# Patient Record
Sex: MALE | Race: WHITE | NOT HISPANIC OR LATINO | Employment: STUDENT | RURAL
[De-identification: names, ages, dates, MRNs, and addresses within clinical notes are randomized per-mention and may not be internally consistent; named-entity substitution may affect disease eponyms.]

---
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
## 2025-01-13 ENCOUNTER — ATHLETIC TRAINING SESSION (OUTPATIENT)
Dept: SPORTS MEDICINE | Facility: CLINIC | Age: 20
End: 2025-01-13
Payer: COMMERCIAL

## 2025-01-13 DIAGNOSIS — M75.21 BICEPS TENDINITIS OF RIGHT SHOULDER: Primary | ICD-10-CM

## 2025-01-13 DIAGNOSIS — S46.811A TRAPEZIUS MUSCLE STRAIN, RIGHT, INITIAL ENCOUNTER: ICD-10-CM

## 2025-01-13 NOTE — PROGRESS NOTES
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    HPI    ROS              Objective: tight trapezius on R side       General: Zafar is well-developed, well-nourished, appears stated age, in no acute distress, alert and oriented to time, place and person.     AT Session          Assessment: Bicep Tendinitis or tight muscles      Status: F - Full Participation    Date Seen:  01/13/2025    Date of Injury:  N/A    Date Out:  N/A    Date Cleared:  N/A        Treatment/Rehab/Maintenance: electrical stimulation, moist heat pad, and cupping            Plan: continue stretching and warming-up before pitching       1. N/A  2. Physician Referral: no  3. ED Referral:no  4. Parent/Guardian Notified: No  5. All questions were answered, ath. will contact me for questions or concerns in  the interim.  6.         Eligible to use School Insurance: Yes

## 2025-02-05 ENCOUNTER — ATHLETIC TRAINING SESSION (OUTPATIENT)
Dept: SPORTS MEDICINE | Facility: CLINIC | Age: 20
End: 2025-02-05
Payer: COMMERCIAL

## 2025-02-05 DIAGNOSIS — M25.511 ACUTE PAIN OF RIGHT SHOULDER: Primary | ICD-10-CM

## 2025-02-05 DIAGNOSIS — S59.911A INJURY OF RIGHT FOREARM, INITIAL ENCOUNTER: ICD-10-CM

## 2025-02-05 NOTE — PROGRESS NOTES
Reason for Encounter Follow-Up    Subjective: pain of right shoulder, biceps and forearm       Chief Complaint: Zafar Cisneros is a 19 y.o. male student at [employer] who had concerns including Pain of the Right Shoulder (Soreness from pitching) and Pain of the Right Forearm (Soreness from pitching).    Handedness: right-handed  Sport played: baseball      Level: college      Position:pitcher      Pain  This is a recurrent problem. The current episode started 1 to 4 weeks ago. The problem occurs intermittently. The problem has been rapidly improving. The symptoms are aggravated by exertion and twisting. He has tried heat and ice for the symptoms. The treatment provided moderate relief.       Review of Systems   Musculoskeletal:  Positive for joint pain and stiffness.   All other systems reviewed and are negative.                Objective: pain in right shoulder, biceps and forearm       General: Zafar is well-developed, well-nourished, appears stated age, in no acute distress, alert and oriented to time, place and person.     AT Session          Assessment: muscle soreness in right shoulder, biceps and forearm due to pitching     Status: F - Full Participation    Date Seen:  2/5/2025    Date of Injury:  2/4/2025    Date Out:  N/A    Date Cleared:  N/A        Treatment/Rehab/Maintenance: []  INJURY TREATMENT   [x]  MAINTENANCE  DATE OF SERVICE: 2/5/2025  INJURY/CONDITON: right shoulder, biceps and forearm      MODALITIES:        THERAPEUTIC EXERCISES:   MISCELLANEOUS:  []Active Release       []Agility      []Compression Wrap  []Cupping        []Balance      []Support Wrap  []Dry Needling       []Blood Flow Restriction    []Taping - Preventative  []E-Stim- Bi-Phasic       []Foam Roller      []Taping - Injured Part  [x]E-Stim- IFC        []Functional Exercises    []Wound Care  []E-Stim- Micro       []Hydroworks        [x]E-Stim- Premod       []Joint Mobilization  []E-Stim- [place]       []Manual  Therapy  []E-Stim- Triphasic PENS      []Plyometric Exercises  []Cold Tub        []Proprioception Exercises  []Contrast Tub       []Rehab - Aquatic  []Game Ready       []Rehab - Back  [x]Hot Pack        []Rehab - Bike  []Hot Tub        []Rehab - Core  []Ice Cup        []Rehab - Elliptical  []Ice Pack        []Rehab - Exercise  [x]Instrumental Assisted Soft Tissue Mobilization (IASTM)  []Rehab - Lower Extremity  []Intermittent Compression      []Rehab - Neck  []Laser        []Rehab - Run  []Lightwave        []Rehab - Treadmill  []Massage        []Rehab - Upper Body Ergometer  []Massage - Instrument Assisted     []Rehab - Upper Extremity  []Massage - Scar Tissue      []Return to Play - Concussion Protocol  []Massage - Self Administered     []Return to Play - Sport Specific  []Massage- Therapeutic      []ROM - Active  []Myofascial Release       []ROM - Passive  []Paraffin Wax       []Strengthening Exercises  []Ultrasound        []Stretching - Active  []Ultrasound and E-Stim      []Stretching - Dynamic           []Stretching - Passive  []Stretching - PNF  []Stretching - Static          OTHER/COMMENTS:                                Plan: Treat for pain/soreness, referral to orthopedics is no improvement.        1. N/A  2. Physician Referral: no  3. ED Referral:no  4. Parent/Guardian Notified: No  5. All questions were answered, ath. will contact me for questions or concerns in  the interim.  6.         Eligible to use School Insurance: Yes

## 2025-03-03 ENCOUNTER — ATHLETIC TRAINING SESSION (OUTPATIENT)
Dept: SPORTS MEDICINE | Facility: CLINIC | Age: 20
End: 2025-03-03
Payer: COMMERCIAL

## 2025-03-03 DIAGNOSIS — M25.511 ACUTE PAIN OF RIGHT SHOULDER: Primary | ICD-10-CM

## 2025-03-03 DIAGNOSIS — M75.21 BICEPS TENDINITIS OF RIGHT SHOULDER: ICD-10-CM

## 2025-03-03 NOTE — PROGRESS NOTES
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    Handedness: right-handed  Sport played: baseball      Level: college      Position:pitcher    Zafar also participates in baseball.  Pain  This is a recurrent problem. The current episode started 1 to 4 weeks ago. The problem occurs intermittently. The problem has been rapidly improving. The symptoms are aggravated by exertion and twisting (pitching). He has tried heat (electrical stimulation) for the symptoms. The treatment provided moderate relief.       Review of Systems   Musculoskeletal:  Positive for joint pain and stiffness.   All other systems reviewed and are negative.                Objective: pain in right shoulder, biceps and forearm       General: Zafar is well-developed, well-nourished, appears stated age, in no acute distress, alert and oriented to time, place and person.     AT Session          Assessment: muscle soreness in right shoulder, biceps and forearm due to pitching     Status: F - Full Participation    Date Seen:  03/03/2025    Date of Injury:  2/4/2025    Date Out:  N/A    Date Cleared:  N/A        Treatment/Rehab/Maintenance: []  INJURY TREATMENT   [x]  MAINTENANCE  DATE OF SERVICE: 03/03/2025  INJURY/CONDITON: right shoulder, biceps and forearm      MODALITIES:        THERAPEUTIC EXERCISES:   MISCELLANEOUS:  []Active Release       []Agility      []Compression Wrap  []Cupping        []Balance      []Support Wrap  []Dry Needling       []Blood Flow Restriction    []Taping - Preventative  []E-Stim- Bi-Phasic       []Foam Roller      []Taping - Injured Part  [x]E-Stim- IFC        []Functional Exercises    []Wound Care  []E-Stim- Micro       []Hydroworks        []E-Stim- Premod       []Joint  Mobilization  []E-Stim- [place]       []Manual Therapy  []E-Stim- Triphasic PENS      []Plyometric Exercises  []Cold Tub        []Proprioception Exercises  []Contrast Tub       []Rehab - Aquatic  []Game Ready       []Rehab - Back  [x]Hot Pack        []Rehab - Bike  []Hot Tub        []Rehab - Core  []Ice Cup        []Rehab - Elliptical  []Ice Pack        []Rehab - Exercise  [x]Instrumental Assisted Soft Tissue Mobilization (IASTM)  []Rehab - Lower Extremity  []Intermittent Compression      []Rehab - Neck  []Laser        []Rehab - Run  []Lightwave        []Rehab - Treadmill  []Massage        []Rehab - Upper Body Ergometer  []Massage - Instrument Assisted     []Rehab - Upper Extremity  []Massage - Scar Tissue      []Return to Play - Concussion Protocol  []Massage - Self Administered     []Return to Play - Sport Specific  []Massage- Therapeutic      []ROM - Active  []Myofascial Release       []ROM - Passive  []Paraffin Wax       []Strengthening Exercises  []Ultrasound        []Stretching - Active  []Ultrasound and E-Stim      []Stretching - Dynamic           []Stretching - Passive  []Stretching - PNF  []Stretching - Static          OTHER/COMMENTS:                                Plan: Treat for pain/soreness, referral to orthopedics is no improvement.        1. N/A  2. Physician Referral: no  3. ED Referral:no  4. Parent/Guardian Notified: No  5. All questions were answered, ath. will contact me for questions or concerns in  the interim.  6.         Eligible to use School Insurance: Yes

## 2025-03-04 ENCOUNTER — ATHLETIC TRAINING SESSION (OUTPATIENT)
Dept: SPORTS MEDICINE | Facility: CLINIC | Age: 20
End: 2025-03-04
Payer: COMMERCIAL

## 2025-03-04 DIAGNOSIS — M75.21 BICEPS TENDINITIS OF RIGHT SHOULDER: Primary | ICD-10-CM

## 2025-03-04 NOTE — PROGRESS NOTES
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    Handedness: right-handed  Sport played: baseball      Level: college      Position:pitcher    Zafar also participates in baseball.  Pain  This is a recurrent problem. The current episode started 1 to 4 weeks ago. The problem occurs intermittently. The problem has been rapidly improving. The symptoms are aggravated by exertion and twisting (pitching). He has tried heat (electrical stimulation) for the symptoms. The treatment provided moderate relief.       Review of Systems   Musculoskeletal:  Positive for joint pain and stiffness.   All other systems reviewed and are negative.                Objective: pain in right shoulder, biceps and forearm       General: Zafar is well-developed, well-nourished, appears stated age, in no acute distress, alert and oriented to time, place and person.     AT Session          Assessment: muscle soreness in right shoulder, biceps and forearm due to pitching     Status: F - Full Participation    Date Seen:  03/04/2025    Date of Injury:  02/04/2025    Date Out:  N/A    Date Cleared:  N/A        Treatment/Rehab/Maintenance: []  INJURY TREATMENT   [x]  MAINTENANCE  DATE OF SERVICE: 03/04/2025  INJURY/CONDITON: right shoulder, biceps and forearm      MODALITIES:        THERAPEUTIC EXERCISES:   MISCELLANEOUS:  []Active Release       []Agility      []Compression Wrap  []Cupping        []Balance      []Support Wrap  []Dry Needling       []Blood Flow Restriction    []Taping - Preventative  []E-Stim- Bi-Phasic       []Foam Roller      []Taping - Injured Part  []E-Stim- IFC        []Functional Exercises    []Wound Care  []E-Stim- Micro       []Hydroworks        [x]E-Stim- Premod       []Joint Mobilization  []E-Stim- [place]       []Manual  Therapy  []E-Stim- Triphasic PENS      []Plyometric Exercises  []Cold Tub        []Proprioception Exercises  []Contrast Tub       []Rehab - Aquatic  []Game Ready       []Rehab - Back  [x]Hot Pack        []Rehab - Bike  []Hot Tub        []Rehab - Core  []Ice Cup        []Rehab - Elliptical  []Ice Pack        []Rehab - Exercise  []Instrumental Assisted Soft Tissue Mobilization (IASTM)  []Rehab - Lower Extremity  [x]Intermittent Compression      []Rehab - Neck  []Laser        []Rehab - Run  []Lightwave        []Rehab - Treadmill  []Massage        []Rehab - Upper Body Ergometer  []Massage - Instrument Assisted     []Rehab - Upper Extremity  []Massage - Scar Tissue      []Return to Play - Concussion Protocol  []Massage - Self Administered     []Return to Play - Sport Specific  []Massage- Therapeutic      []ROM - Active  []Myofascial Release       []ROM - Passive  []Paraffin Wax       []Strengthening Exercises  []Ultrasound        []Stretching - Active  []Ultrasound and E-Stim      []Stretching - Dynamic           []Stretching - Passive  []Stretching - PNF  []Stretching - Static          OTHER/COMMENTS:                                Plan: Treat for pain/soreness, referral to orthopedics is no improvement.        1. N/A  2. Physician Referral: no  3. ED Referral:no  4. Parent/Guardian Notified: No  5. All questions were answered, ath. will contact me for questions or concerns in  the interim.  6.         Eligible to use School Insurance: Yes

## 2025-03-14 ENCOUNTER — ATHLETIC TRAINING SESSION (OUTPATIENT)
Dept: SPORTS MEDICINE | Facility: CLINIC | Age: 20
End: 2025-03-14
Payer: COMMERCIAL

## 2025-03-14 DIAGNOSIS — Z00.00 HEALTHCARE MAINTENANCE: ICD-10-CM

## 2025-03-14 DIAGNOSIS — M25.511 ACUTE PAIN OF RIGHT SHOULDER: Primary | ICD-10-CM

## 2025-03-14 NOTE — PROGRESS NOTES
Reason for Encounter New Injury    Subjective: acute right posterior shoulder pain       Chief Complaint: Zafar Cisneros is a 20 y.o. male  at [employer] who had concerns including Pain of the Right Shoulder (Poster right shoulder pain).    Handedness: right-handed  Sport played: baseball      Level: college      Position:pitcher      Pain  This is a recurrent problem. The current episode started in the past 7 days. The problem occurs intermittently. The problem has been rapidly improving. The symptoms are aggravated by exertion. He has tried heat and ice for the symptoms. The treatment provided moderate relief.       Review of Systems   Musculoskeletal:  Positive for muscle weakness and stiffness.   All other systems reviewed and are negative.                Objective: acute right posterior shoulder pain       General: Zafar is well-developed, well-nourished, appears stated age, in no acute distress, alert and oriented to time, place and person.     AT Session          Assessment: posterior right shoulder, rotator cuff soreness , fatigue from pitching.     Status: AT - Cleared to Exert    Date Seen:  03/14/2025    Date of Injury:  03/12/2025    Date Out:  N/A    Date Cleared:  N/A        Treatment/Rehab/Maintenance: [x]  INJURY TREATMENT   []  MAINTENANCE  DATE OF SERVICE: 3/14/2025  INJURY/CONDITON: right shoulder      MODALITIES:        THERAPEUTIC EXERCISES:   MISCELLANEOUS:  []Active Release       []Agility      []Compression Wrap  []Cupping        []Balance      []Support Wrap  []Dry Needling       []Blood Flow Restriction    []Taping - Preventative  []E-Stim- Bi-Phasic       []Foam Roller      []Taping - Injured Part  []E-Stim- IFC        []Functional Exercises    []Wound Care  []E-Stim- Micro       []Hydroworks        [x]E-Stim- Premod       []Joint Mobilization  []E-Stim- [place]       []Manual Therapy  []E-Stim- Triphasic PENS      []Plyometric Exercises  []Cold  Tub        []Proprioception Exercises  []Contrast Tub       []Rehab - Aquatic  []Game Ready       []Rehab - Back  [x]Hot Pack        []Rehab - Bike  []Hot Tub        []Rehab - Core  []Ice Cup        []Rehab - Elliptical  []Ice Pack        []Rehab - Exercise  [x]Instrumental Assisted Soft Tissue Mobilization (IASTM)  []Rehab - Lower Extremity  []Intermittent Compression      []Rehab - Neck  []Laser        []Rehab - Run  []Lightwave        []Rehab - Treadmill  []Massage        []Rehab - Upper Body Ergometer  []Massage - Instrument Assisted     []Rehab - Upper Extremity  []Massage - Scar Tissue      []Return to Play - Concussion Protocol  []Massage - Self Administered     []Return to Play - Sport Specific  []Massage- Therapeutic      []ROM - Active  []Myofascial Release       []ROM - Passive  []Paraffin Wax       []Strengthening Exercises  []Ultrasound        [x]Stretching - Active  []Ultrasound and E-Stim      []Stretching - Dynamic           []Stretching - Passive  []Stretching - PNF  []Stretching - Static          OTHER/COMMENTS:                                Plan: continue to treat for pain, referral to orthopedics if no improvement is shown       1. Orthopedics  2. Physician Referral: no  3. ED Referral:no  4. Parent/Guardian Notified: No  5. All questions were answered, ath. will contact me for questions or concerns in  the interim.  6.         Eligible to use School Insurance: Yes

## 2025-03-24 ENCOUNTER — ATHLETIC TRAINING SESSION (OUTPATIENT)
Dept: SPORTS MEDICINE | Facility: CLINIC | Age: 20
End: 2025-03-24
Payer: COMMERCIAL

## 2025-03-24 DIAGNOSIS — M62.830 SPASM OF LEFT TRAPEZIUS MUSCLE: Primary | ICD-10-CM

## 2025-03-24 DIAGNOSIS — M75.21 BICEPS TENDINITIS OF RIGHT SHOULDER: Primary | ICD-10-CM

## 2025-03-25 NOTE — PROGRESS NOTES
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    Handedness: right-handed  Sport played: baseball      Level: college      Position:pitcher    Zafar also participates in baseball.  Pain  This is a new problem. The current episode started today. The problem occurs intermittently. The problem has been unchanged. The symptoms are aggravated by twisting. He has tried heat (electrical stimulation) for the symptoms. The treatment provided moderate relief.       ROS              Objective: muscle spasm of upper back        General: Zafar is well-developed, well-nourished, appears stated age, in no acute distress, alert and oriented to time, place and person.     AT Session          Assessment: poss muscle spasm of lower trapezius      Status: F - Full Participation    Date Seen:  03/24/2025    Date of Injury:  03/24/2025    Date Out:  N/A    Date Cleared:  N/A        Treatment/Rehab/Maintenance: [x]  INJURY TREATMENT   []  MAINTENANCE  DATE OF SERVICE: 03/24/2025  INJURY/CONDITON: L trapezius muscle spasm      MODALITIES:        THERAPEUTIC EXERCISES:   MISCELLANEOUS:  []Active Release       []Agility      []Compression Wrap  []Cupping        []Balance      []Support Wrap  []Dry Needling       []Blood Flow Restriction    []Taping - Preventative  []E-Stim- Bi-Phasic       []Foam Roller      []Taping - Injured Part  []E-Stim- IFC        []Functional Exercises    []Wound Care  []E-Stim- Micro       []Hydroworks        [x]E-Stim- Premod       []Joint Mobilization  []E-Stim- [place]       []Manual Therapy  []E-Stim- Triphasic PENS      []Plyometric Exercises  []Cold Tub        []Proprioception Exercises  []Contrast Tub       []Rehab - Aquatic  []Game Ready       []Rehab - Back  [x]Hot Pack        []Rehab - Bike  []Hot Tub        []Rehab -  Core  []Ice Cup        []Rehab - Elliptical  []Ice Pack        []Rehab - Exercise  [x]Instrumental Assisted Soft Tissue Mobilization (IASTM)  []Rehab - Lower Extremity  []Intermittent Compression      []Rehab - Neck  []Laser        []Rehab - Run  []Lightwave        []Rehab - Treadmill  []Massage        []Rehab - Upper Body Ergometer  []Massage - Instrument Assisted     []Rehab - Upper Extremity  []Massage - Scar Tissue      []Return to Play - Concussion Protocol  []Massage - Self Administered     []Return to Play - Sport Specific  []Massage- Therapeutic      []ROM - Active  []Myofascial Release       []ROM - Passive  []Paraffin Wax       []Strengthening Exercises  []Ultrasound        [x]Stretching - Active  []Ultrasound and E-Stim      []Stretching - Dynamic           [x]Stretching - Passive  []Stretching - PNF  []Stretching - Static          OTHER/COMMENTS:                                 Plan: Treat pain; work on flexibility of chest and strengthen scapular muscles; Will refer to Orthopedics if pain continues or worsens.       1. N/A  2. Physician Referral: no  3. ED Referral:no  4. Parent/Guardian Notified: No  5. All questions were answered, ath. will contact me for questions or concerns in  the interim.  6.         Eligible to use School Insurance: Yes

## 2025-04-11 ENCOUNTER — ATHLETIC TRAINING SESSION (OUTPATIENT)
Dept: SPORTS MEDICINE | Facility: CLINIC | Age: 20
End: 2025-04-11
Payer: COMMERCIAL

## 2025-04-11 DIAGNOSIS — Z00.00 HEALTHCARE MAINTENANCE: Primary | ICD-10-CM

## 2025-04-11 NOTE — PROGRESS NOTES
Reason for Encounter N/A    Subjective: health care maintenance of R shoulder and R elbow after pitching       Chief Complaint: Zafar Cisneros is a 20 y.o. male  at [employer] who had concerns including Pain of the Right Shoulder.    Handedness: right-handed  Sport played: baseball      Level: college      Position:pitcher    Zafar also participates in baseball.  Pain  This is a recurrent problem. The current episode started 1 to 4 weeks ago. The problem occurs intermittently. He has tried heat (electrical stimulation) for the symptoms. The treatment provided significant relief.       ROS              Objective:  health care maintenance of right shoulder and right elbow       General: Zafar is well-developed, well-nourished, appears stated age, in no acute distress, alert and oriented to time, place and person.     AT Session          Assessment: healthcare maintenance     Status: F - Full Participation    Date Seen:  04/11/2025    Date of Injury:  N/A    Date Out:  N/A    Date Cleared:  N/A        Treatment/Rehab/Maintenance: []  INJURY TREATMENT   [x]  MAINTENANCE  DATE OF SERVICE: 04/11/2025  INJURY/CONDITON: R shoulder/R elbow      MODALITIES:        THERAPEUTIC EXERCISES:   MISCELLANEOUS:  []Active Release       []Agility      []Compression Wrap  []Cupping        []Balance      []Support Wrap  []Dry Needling       []Blood Flow Restriction    []Taping - Preventative  []E-Stim- Bi-Phasic       []Foam Roller      []Taping - Injured Part  []E-Stim- IFC        []Functional Exercises    []Wound Care  []E-Stim- Micro       []Hydroworks        [x]E-Stim- Premod       []Joint Mobilization  []E-Stim- [place]       []Manual Therapy  []E-Stim- Triphasic PENS      []Plyometric Exercises  []Cold Tub        []Proprioception Exercises  []Contrast Tub       []Rehab - Aquatic  []Game Ready       []Rehab - Back  [x]Hot Pack        []Rehab - Bike  []Hot Tub        []Rehab - Core  []Ice  Cup        []Rehab - Elliptical  []Ice Pack        []Rehab - Exercise  []Instrumental Assisted Soft Tissue Mobilization (IASTM)  []Rehab - Lower Extremity  []Intermittent Compression      []Rehab - Neck  []Laser        []Rehab - Run  []Lightwave        []Rehab - Treadmill  []Massage        []Rehab - Upper Body Ergometer  []Massage - Instrument Assisted     []Rehab - Upper Extremity  []Massage - Scar Tissue      []Return to Play - Concussion Protocol  []Massage - Self Administered     []Return to Play - Sport Specific  []Massage- Therapeutic      []ROM - Active  []Myofascial Release       []ROM - Passive  []Paraffin Wax       []Strengthening Exercises  []Ultrasound        []Stretching - Active  []Ultrasound and E-Stim      []Stretching - Dynamic           []Stretching - Passive  []Stretching - PNF  []Stretching - Static          OTHER/COMMENTS:                                 Plan: treat pain; will refer to Orthopedics if no improvement        1. Orthopedics  2. Physician Referral: no  3. ED Referral:no  4. Parent/Guardian Notified: No  5. All questions were answered, ath. will contact me for questions or concerns in  the interim.  6.         Eligible to use School Insurance: Yes

## 2025-04-16 ENCOUNTER — ATHLETIC TRAINING SESSION (OUTPATIENT)
Dept: SPORTS MEDICINE | Facility: CLINIC | Age: 20
End: 2025-04-16
Payer: COMMERCIAL

## 2025-04-16 DIAGNOSIS — Z00.00 HEALTHCARE MAINTENANCE: Primary | ICD-10-CM

## 2025-04-16 NOTE — PROGRESS NOTES
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    Handedness: right-handed  Sport played: baseball      Level: college      Position:pitcher    Zafar also participates in baseball.  Pain  This is a new problem. The current episode started today. The problem occurs intermittently. The problem has been unchanged. The symptoms are aggravated by twisting. He has tried heat (electrical stimulation) for the symptoms. The treatment provided moderate relief.       ROS              Objective: soreness and tightness of right shoulder       General: Zafar is well-developed, well-nourished, appears stated age, in no acute distress, alert and oriented to time, place and person.     AT Session          Assessment: poss muscle spasm of lower trapezius      Status: F - Full Participation    Date Seen:  04/16/2025    Date of Injury:  N/A    Date Out:  N/A    Date Cleared:  N/A        Treatment/Rehab/Maintenance: []  INJURY TREATMENT   [x]  MAINTENANCE  DATE OF SERVICE: 04/16/2025  INJURY/CONDITON: R shoulder pain      MODALITIES:        THERAPEUTIC EXERCISES:   MISCELLANEOUS:  []Active Release       []Agility      []Compression Wrap  []Cupping        []Balance      []Support Wrap  []Dry Needling       []Blood Flow Restriction    []Taping - Preventative  []E-Stim- Bi-Phasic       []Foam Roller      []Taping - Injured Part  []E-Stim- IFC        []Functional Exercises    []Wound Care  []E-Stim- Micro       []Hydroworks        []E-Stim- Premod       []Joint Mobilization  []E-Stim- [place]       []Manual Therapy  []E-Stim- Triphasic PENS      []Plyometric Exercises  []Cold Tub        []Proprioception Exercises  []Contrast Tub       []Rehab - Aquatic  []Game Ready       []Rehab - Back  [x]Hot Pack        []Rehab - Bike  []Hot  Tub        []Rehab - Core  []Ice Cup        []Rehab - Elliptical  []Ice Pack        []Rehab - Exercise  [x]Instrumental Assisted Soft Tissue Mobilization (IASTM)  []Rehab - Lower Extremity  []Intermittent Compression      []Rehab - Neck  []Laser        []Rehab - Run  []Lightwave        []Rehab - Treadmill  []Massage        []Rehab - Upper Body Ergometer  []Massage - Instrument Assisted     []Rehab - Upper Extremity  []Massage - Scar Tissue      []Return to Play - Concussion Protocol  []Massage - Self Administered     []Return to Play - Sport Specific  []Massage- Therapeutic      []ROM - Active  []Myofascial Release       []ROM - Passive  []Paraffin Wax       []Strengthening Exercises  []Ultrasound        []Stretching - Active  []Ultrasound and E-Stim      []Stretching - Dynamic           []Stretching - Passive  []Stretching - PNF  []Stretching - Static          OTHER/COMMENTS:                                 Plan: Treat pain; work on flexibility of chest and strengthen scapular muscles; Will refer to Orthopedics if no improvement.       1. N/A  2. Physician Referral: no  3. ED Referral:no  4. Parent/Guardian Notified: No  5. All questions were answered, ath. will contact me for questions or concerns in  the interim.  6.         Eligible to use School Insurance: Yes

## 2025-04-16 NOTE — PROGRESS NOTES
Reason for Encounter N/A    Subjective: soreness and tightness of right shoulder and right elbow after pitching       Chief Complaint: Zafar Cisneros is a 20 y.o. male  at [employer] who had concerns including Pain of the Right Shoulder.    Handedness: right-handed  Sport played: baseball      Level: college      Position:pitcher    Zafar also participates in baseball.  Pain  This is a recurrent problem. The current episode started 1 to 4 weeks ago. The problem occurs intermittently. He has tried heat (electrical stimulation) for the symptoms. The treatment provided significant relief.       ROS              Objective:  soreness and tightness of right shoulder and right elbow       General: Zafar is well-developed, well-nourished, appears stated age, in no acute distress, alert and oriented to time, place and person.     AT Session          Assessment: healthcare maintenance     Status: F - Full Participation    Date Seen:  04/16/2025    Date of Injury:  N/A    Date Out:  N/A    Date Cleared:  N/A        Treatment/Rehab/Maintenance: []  INJURY TREATMENT   [x]  MAINTENANCE  DATE OF SERVICE: 04/16/2025  INJURY/CONDITON: R shoulder/R elbow      MODALITIES:        THERAPEUTIC EXERCISES:   MISCELLANEOUS:  []Active Release       []Agility      []Compression Wrap  []Cupping        []Balance      []Support Wrap  []Dry Needling       []Blood Flow Restriction    []Taping - Preventative  []E-Stim- Bi-Phasic       []Foam Roller      []Taping - Injured Part  []E-Stim- IFC        []Functional Exercises    []Wound Care  []E-Stim- Micro       []Hydroworks        [x]E-Stim- Premod       []Joint Mobilization  []E-Stim- [place]       []Manual Therapy  []E-Stim- Triphasic PENS      []Plyometric Exercises  []Cold Tub        []Proprioception Exercises  []Contrast Tub       []Rehab - Aquatic  []Game Ready       []Rehab - Back  [x]Hot Pack        []Rehab - Bike  []Hot Tub        []Rehab - Core  []Ice  Cup        []Rehab - Elliptical  []Ice Pack        []Rehab - Exercise  [x]Instrumental Assisted Soft Tissue Mobilization (IASTM)  []Rehab - Lower Extremity  []Intermittent Compression      []Rehab - Neck  []Laser        []Rehab - Run  []Lightwave        []Rehab - Treadmill  []Massage        []Rehab - Upper Body Ergometer  []Massage - Instrument Assisted     []Rehab - Upper Extremity  []Massage - Scar Tissue      []Return to Play - Concussion Protocol  []Massage - Self Administered     []Return to Play - Sport Specific  []Massage- Therapeutic      []ROM - Active  []Myofascial Release       []ROM - Passive  []Paraffin Wax       []Strengthening Exercises  []Ultrasound        []Stretching - Active  []Ultrasound and E-Stim      []Stretching - Dynamic           []Stretching - Passive  []Stretching - PNF  []Stretching - Static          OTHER/COMMENTS:                                 Plan: treat pain; will refer to Orthopedics if no improvement        1. Orthopedics  2. Physician Referral: no  3. ED Referral:no  4. Parent/Guardian Notified: No  5. All questions were answered, ath. will contact me for questions or concerns in  the interim.  6.         Eligible to use School Insurance: Yes